# Patient Record
Sex: MALE | Race: BLACK OR AFRICAN AMERICAN | NOT HISPANIC OR LATINO | Employment: FULL TIME | URBAN - METROPOLITAN AREA
[De-identification: names, ages, dates, MRNs, and addresses within clinical notes are randomized per-mention and may not be internally consistent; named-entity substitution may affect disease eponyms.]

---

## 2023-09-12 ENCOUNTER — HOSPITAL ENCOUNTER (EMERGENCY)
Facility: CLINIC | Age: 23
Discharge: HOME | End: 2023-09-12
Attending: STUDENT IN AN ORGANIZED HEALTH CARE EDUCATION/TRAINING PROGRAM | Admitting: STUDENT IN AN ORGANIZED HEALTH CARE EDUCATION/TRAINING PROGRAM
Payer: COMMERCIAL

## 2023-09-12 VITALS
SYSTOLIC BLOOD PRESSURE: 120 MMHG | OXYGEN SATURATION: 100 % | WEIGHT: 192 LBS | HEART RATE: 54 BPM | BODY MASS INDEX: 24.64 KG/M2 | TEMPERATURE: 98.2 F | DIASTOLIC BLOOD PRESSURE: 80 MMHG | RESPIRATION RATE: 18 BRPM | HEIGHT: 74 IN

## 2023-09-12 DIAGNOSIS — R22.32 AXILLARY LUMP, LEFT: ICD-10-CM

## 2023-09-12 DIAGNOSIS — M79.645 FINGER PAIN, LEFT: ICD-10-CM

## 2023-09-12 DIAGNOSIS — M25.572 ACUTE LEFT ANKLE PAIN: ICD-10-CM

## 2023-09-12 PROCEDURE — 99283 EMERGENCY DEPT VISIT LOW MDM: CPT

## 2023-09-12 ASSESSMENT — ACTIVITIES OF DAILY LIVING (ADL): ADLS_ACUITY_SCORE: 35

## 2023-09-12 NOTE — ED PROVIDER NOTES
"  History     Chief Complaint:  Hand Pain and Arm Pain       The history is provided by the patient.      Curtis Chand is an otherwise healthy 22 year old male who presents with multiple complaints. He first noticed a lump in his left arm pit yesterday, and continues to have it since then. The area is not painful until it pressed on. He denies recent illnesses, and does not have a history of abscesses. He denies cough, sore throat, fever, diaphoresis, or recent weight loss. He does not have a family history of breast cancer.     Additionally, he has been having left ankle pain lately. He previously sprained it last year, and rolled it a couple times within the past month. He is having some difficulty bearing weight on the foot. He denies numbness/tingling.     Furthermore, he complains of pain to his lateral left pinky finger lately. He denies recent trauma or injuries to the finger.     Independent Historian:   None - Patient Only    Review of External Notes:   None     Medications:    The patient denies current use of medications.     Past Medical History:    The patient denies pertinent past medical history.    Past Surgical History:    The patient denies pertinent past surgical history.      Physical Exam   Patient Vitals for the past 24 hrs:   BP Temp Pulse Resp SpO2 Height Weight   09/12/23 1655 -- -- -- -- 100 % -- --   09/12/23 1654 -- -- -- -- 100 % -- --   09/12/23 1653 120/80 98.2  F (36.8  C) 54 18 100 % 1.88 m (6' 2\") 87.1 kg (192 lb)        Physical Exam  Vitals: Reviewed, as above.    General: Alert and oriented. Resting on bed.  Skin: Warm and well-perfused.    HEENT:   Head: Normocephalic, atraumatic. Facial features symmetric.   Eyes: Conjunctiva pink, sclera white. EOMs grossly intact.   Ears: Auricles without lesion, erythema, or edema.   Nose: Symmetric with no discharge.  Mouth and throat: Lips are moist. Buccal mucosa is pink and moist without lesions. Oropharyngeal mucosa is pink and " moist with no erythema, edema, or exudate. Uvula is midline.  Neck: Supple with no lymphadenopathy. Full ROM.   Pulmonary: Chest wall expansion symmetric with no increased work of breathing. Lungs clear to auscultation bilaterally.   Breast/Lymphatic: 1 cm tender, mobile, soft mass to the left axilla. No erythema or exudate. No masses to the bilateral breast tissue or the right axilla.   Cardiovascular: Heart RRR with no murmurs. 2+ radial and tibialis posterior pulses bilaterally. No peripheral edema.  Musculoskeletal: Moves all extremities spontaneously.  No bimalleolar tenderness, proximal metatarsal tenderness, or proximal fibular head tendernessto the left ankle.  Nontender palpation of the left fingers with no erythema.  Neuro: 5/5 strength with flexion and extension at bilateral hips, knees, and ankles.  I suspect her compression extension of the left fifth (small) finger MCP, PIP, and DIP  Psych: Affect appropriate.  Answers questions appropriately. Patient appears calm.      Emergency Department Course     Emergency Department Course & Assessments:    Interventions:  None      Assessments:  1658 My PA student first obtained history and relayed the information back to me.  1710 I obtained history and examined the patient as noted above. I explained findings and discussed plan for discharge home.    Independent Interpretation (X-rays, CTs, rhythm strip):  None    Consultations/Discussion of Management or Tests:  None     Social Determinants of Health affecting care:   Lacks established primary care     Disposition:  The patient was discharged to home.     Impression & Plan      Medical Decision Making:  Curtis Chand is an otherwise healthy 22 year old male who presents with multiple complaints.  Please see HPI and exam for details.  Differential includes fracture, dislocation, sprain, abscess, hidradenitis suppurativa, malignancy such as lymphoma, among others.  Patient has a reassuring physical exam  with stable vitals.  He has no systemic symptoms and is afebrile.  With regard to his ankle pain, this is a chronic problem following a sprain 1 and 1/2 years ago, and I recommended that he follow-up with his PCP or with orthopedics.  With regard to his pinky pain, he is actually not very concerned about this, and there is no abnormality seen on exam with no erythema or pain with range of motion.  With regard to the lump in his axilla, this is most likely a reactive lymph node, however developing abscess and lymphoma do remain on the differential.  Patient will likely benefit from outpatient ultrasound, which can be arranged through his primary provider.  I did provide a referral for primary care to help to locate this process.  No indication for emergent ultrasound imaging today.  Return precautions discussed for systemic symptoms, fevers, redness, swelling, or internal pain.  He is comfortable with this plan.      Diagnosis:    ICD-10-CM    1. Axillary lump, left  R22.32 Primary Care Referral      2. Finger pain, left  M79.645       3. Acute left ankle pain  M25.572            Scribe Disclosure:  I, Star Cortez, am serving as a scribe at 5:28 PM on 9/12/2023 to document services personally performed by Claudia Veliz PA-C based on my observations and the provider's statements to me.   9/12/2023   Claudia Veliz PA-C Sells, Jenna, PA-C  09/12/23 1932

## 2023-09-12 NOTE — DISCHARGE INSTRUCTIONS
Like we discussed, I am going to place a referral for primary care.  They will want to follow-up on the lump in your left armpit.  Sometimes, if these do not go away, they want to ultrasound to make sure that it is not something more serious.    Please take ibuprofen and Tylenol for your pinky pain and ankle pain.  I also provided you with the phone number for orthopedics, as are more likely to get in with them quicker than with family care to follow-up on your ankle pain.    Please return for redness, swelling, drainage, fevers, or other new concerns

## 2024-06-12 ENCOUNTER — APPOINTMENT (OUTPATIENT)
Dept: URBAN - METROPOLITAN AREA CLINIC 256 | Age: 24
Setting detail: DERMATOLOGY
End: 2024-06-12

## 2024-06-12 VITALS — HEIGHT: 74 IN | WEIGHT: 197 LBS

## 2024-06-12 DIAGNOSIS — B36.0 PITYRIASIS VERSICOLOR: ICD-10-CM

## 2024-06-12 DIAGNOSIS — L70.0 ACNE VULGARIS: ICD-10-CM

## 2024-06-12 PROCEDURE — 99204 OFFICE O/P NEW MOD 45 MIN: CPT

## 2024-06-12 PROCEDURE — OTHER PRESCRIPTION MEDICATION MANAGEMENT: OTHER

## 2024-06-12 PROCEDURE — OTHER MIPS QUALITY: OTHER

## 2024-06-12 PROCEDURE — OTHER PHOTO-DOCUMENTATION: OTHER

## 2024-06-12 PROCEDURE — OTHER OTC TREATMENT REGIMEN: OTHER

## 2024-06-12 PROCEDURE — OTHER COUNSELING: OTHER

## 2024-06-12 PROCEDURE — OTHER PRESCRIPTION: OTHER

## 2024-06-12 PROCEDURE — OTHER PATIENT SPECIFIC COUNSELING: OTHER

## 2024-06-12 RX ORDER — CLINDAMYCIN PHOSPHATE 10 MG/ML
LOTION TOPICAL
Qty: 60 | Refills: 2 | Status: ERX | COMMUNITY
Start: 2024-06-12

## 2024-06-12 RX ORDER — KETOCONAZOLE 20 MG/ML
SHAMPOO, SUSPENSION TOPICAL QD
Qty: 120 | Refills: 2 | Status: ERX | COMMUNITY
Start: 2024-06-12

## 2024-06-12 RX ORDER — TRETIONIN 1 MG/G
CREAM TOPICAL
Qty: 45 | Refills: 2 | Status: ERX | COMMUNITY
Start: 2024-06-12

## 2024-06-12 ASSESSMENT — LOCATION DETAILED DESCRIPTION DERM
LOCATION DETAILED: MID POSTERIOR NECK
LOCATION DETAILED: RIGHT MEDIAL FOREHEAD
LOCATION DETAILED: RIGHT INFERIOR CENTRAL MALAR CHEEK
LOCATION DETAILED: LEFT INFERIOR CENTRAL MALAR CHEEK

## 2024-06-12 ASSESSMENT — LOCATION SIMPLE DESCRIPTION DERM
LOCATION SIMPLE: RIGHT FOREHEAD
LOCATION SIMPLE: LEFT CHEEK
LOCATION SIMPLE: POSTERIOR NECK
LOCATION SIMPLE: RIGHT CHEEK

## 2024-06-12 ASSESSMENT — LOCATION ZONE DERM
LOCATION ZONE: FACE
LOCATION ZONE: NECK

## 2024-06-12 NOTE — HPI: SECONDARY COMPLAINT
Additional History: The patient mentioned flaring it the summer and was prescribed a shampoo that treated it.

## 2024-06-12 NOTE — PROCEDURE: PRESCRIPTION MEDICATION MANAGEMENT
Initiate Treatment: Apply clindamycin 1% lotion to affected areas once to twice daily.\\nApply tretinoin 0.1 % topical cream to affected areas nightly. May start with every other night x 2-4 weeks and increase as tolerated.
Plan: Recheck in 2-3 months, sooner if worsening.
Detail Level: Zone
Render In Strict Bullet Format?: No
Initiate Treatment: Use ketoconazole 2 % shampoo as a body wash, leave on for 5 minutes, then rinse off. Do this until clear and repeat with flares.

## 2024-06-12 NOTE — PROCEDURE: OTC TREATMENT REGIMEN
Samples Given: Cerave BPO 4-10%
Patient Specific Otc Recommendations (Will Not Stick From Patient To Patient): Wash face with Cerave BPO 4-10% once in the morning
Detail Level: Zone

## 2024-06-12 NOTE — PROCEDURE: COUNSELING
Bactrim Counseling:  I discussed with the patient the risks of sulfa antibiotics including but not limited to GI upset, allergic reaction, drug rash, diarrhea, dizziness, photosensitivity, and yeast infections.  Rarely, more serious reactions can occur including but not limited to aplastic anemia, agranulocytosis, methemoglobinemia, blood dyscrasias, liver or kidney failure, lung infiltrates or desquamative/blistering drug rashes.
Dapsone Pregnancy And Lactation Text: This medication is Pregnancy Category C and is not considered safe during pregnancy or breast feeding.
High Dose Vitamin A Pregnancy And Lactation Text: High dose vitamin A therapy is contraindicated during pregnancy and breast feeding.
Spironolactone Pregnancy And Lactation Text: This medication can cause feminization of the male fetus and should be avoided during pregnancy. The active metabolite is also found in breast milk.
Tazorac Counseling:  Patient advised that medication is irritating and drying.  Patient may need to apply sparingly and wash off after an hour before eventually leaving it on overnight.  The patient verbalized understanding of the proper use and possible adverse effects of tazorac.  All of the patient's questions and concerns were addressed.
Doxycycline Pregnancy And Lactation Text: This medication is Pregnancy Category D and not consider safe during pregnancy. It is also excreted in breast milk but is considered safe for shorter treatment courses.
Use Enhanced Medication Counseling?: No
Dapsone Counseling: I discussed with the patient the risks of dapsone including but not limited to hemolytic anemia, agranulocytosis, rashes, methemoglobinemia, kidney failure, peripheral neuropathy, headaches, GI upset, and liver toxicity.  Patients who start dapsone require monitoring including baseline LFTs and weekly CBCs for the first month, then every month thereafter.  The patient verbalized understanding of the proper use and possible adverse effects of dapsone.  All of the patient's questions and concerns were addressed.
Minocycline Pregnancy And Lactation Text: This medication is Pregnancy Category D and not consider safe during pregnancy. It is also excreted in breast milk.
Birth Control Pills Counseling: Birth Control Pill Counseling: I discussed with the patient the potential side effects of OCPs including but not limited to increased risk of stroke, heart attack, thrombophlebitis, deep venous thrombosis, hepatic adenomas, breast changes, GI upset, headaches, and depression.  The patient verbalized understanding of the proper use and possible adverse effects of OCPs. All of the patient's questions and concerns were addressed.
Tazorac Pregnancy And Lactation Text: This medication is not safe during pregnancy. It is unknown if this medication is excreted in breast milk.
Topical Clindamycin Counseling: Patient counseled that this medication may cause skin irritation or allergic reactions.  In the event of skin irritation, the patient was advised to reduce the amount of the drug applied or use it less frequently.   The patient verbalized understanding of the proper use and possible adverse effects of clindamycin.  All of the patient's questions and concerns were addressed.
Isotretinoin Counseling: Patient should get monthly blood tests, not donate blood, not drive at night if vision affected, not share medication, and not undergo elective surgery for 6 months after tx completed. Side effects reviewed, pt to contact office should one occur.
Azelaic Acid Pregnancy And Lactation Text: This medication is considered safe during pregnancy and breast feeding.
Tetracycline Counseling: Patient counseled regarding possible photosensitivity and increased risk for sunburn.  Patient instructed to avoid sunlight, if possible.  When exposed to sunlight, patients should wear protective clothing, sunglasses, and sunscreen.  The patient was instructed to call the office immediately if the following severe adverse effects occur:  hearing changes, easy bruising/bleeding, severe headache, or vision changes.  The patient verbalized understanding of the proper use and possible adverse effects of tetracycline.  All of the patient's questions and concerns were addressed. Patient understands to avoid pregnancy while on therapy due to potential birth defects.
Aklief counseling:  Patient advised to apply a pea-sized amount only at bedtime and wait 30 minutes after washing their face before applying.  If too drying, patient may add a non-comedogenic moisturizer.  The most commonly reported side effects including irritation, redness, scaling, dryness, stinging, burning, itching, and increased risk of sunburn.  The patient verbalized understanding of the proper use and possible adverse effects of retinoids.  All of the patient's questions and concerns were addressed.
Topical Retinoid counseling:  Patient advised to apply a pea-sized amount only at bedtime and wait 30 minutes after washing their face before applying.  If too drying, patient may add a non-comedogenic moisturizer. The patient verbalized understanding of the proper use and possible adverse effects of retinoids.  All of the patient's questions and concerns were addressed.
Sarecycline Counseling: Patient advised regarding possible photosensitivity and discoloration of the teeth, skin, lips, tongue and gums.  Patient instructed to avoid sunlight, if possible.  When exposed to sunlight, patients should wear protective clothing, sunglasses, and sunscreen.  The patient was instructed to call the office immediately if the following severe adverse effects occur:  hearing changes, easy bruising/bleeding, severe headache, or vision changes.  The patient verbalized understanding of the proper use and possible adverse effects of sarecycline.  All of the patient's questions and concerns were addressed.
Benzoyl Peroxide Pregnancy And Lactation Text: This medication is Pregnancy Category C. It is unknown if benzoyl peroxide is excreted in breast milk.
Minocycline Counseling: Patient advised regarding possible photosensitivity and discoloration of the teeth, skin, lips, tongue and gums.  Patient instructed to avoid sunlight, if possible.  When exposed to sunlight, patients should wear protective clothing, sunglasses, and sunscreen.  The patient was instructed to call the office immediately if the following severe adverse effects occur:  hearing changes, easy bruising/bleeding, severe headache, or vision changes.  The patient verbalized understanding of the proper use and possible adverse effects of minocycline.  All of the patient's questions and concerns were addressed.
High Dose Vitamin A Counseling: Side effects reviewed, pt to contact office should one occur.
Isotretinoin Pregnancy And Lactation Text: This medication is Pregnancy Category X and is considered extremely dangerous during pregnancy. It is unknown if it is excreted in breast milk.
Birth Control Pills Pregnancy And Lactation Text: This medication should be avoided if pregnant and for the first 30 days post-partum.
Topical Sulfur Applications Pregnancy And Lactation Text: This medication is Pregnancy Category C and has an unknown safety profile during pregnancy. It is unknown if this topical medication is excreted in breast milk.
Topical Retinoid Pregnancy And Lactation Text: This medication is Pregnancy Category C. It is unknown if this medication is excreted in breast milk.
Spironolactone Counseling: Patient advised regarding risks of diarrhea, abdominal pain, hyperkalemia, birth defects (for female patients), liver toxicity and renal toxicity. The patient may need blood work to monitor liver and kidney function and potassium levels while on therapy. The patient verbalized understanding of the proper use and possible adverse effects of spironolactone.  All of the patient's questions and concerns were addressed.
Doxycycline Counseling:  Patient counseled regarding possible photosensitivity and increased risk for sunburn.  Patient instructed to avoid sunlight, if possible.  When exposed to sunlight, patients should wear protective clothing, sunglasses, and sunscreen.  The patient was instructed to call the office immediately if the following severe adverse effects occur:  hearing changes, easy bruising/bleeding, severe headache, or vision changes.  The patient verbalized understanding of the proper use and possible adverse effects of doxycycline.  All of the patient's questions and concerns were addressed.
Benzoyl Peroxide Counseling: Patient counseled that medicine may cause skin irritation and bleach clothing.  In the event of skin irritation, the patient was advised to reduce the amount of the drug applied or use it less frequently.   The patient verbalized understanding of the proper use and possible adverse effects of benzoyl peroxide.  All of the patient's questions and concerns were addressed.
Erythromycin Counseling:  I discussed with the patient the risks of erythromycin including but not limited to GI upset, allergic reaction, drug rash, diarrhea, increase in liver enzymes, and yeast infections.
Winlevi Pregnancy And Lactation Text: This medication is considered safe during pregnancy and breastfeeding.
Aklief Pregnancy And Lactation Text: It is unknown if this medication is safe to use during pregnancy.  It is unknown if this medication is excreted in breast milk.  Breastfeeding women should use the topical cream on the smallest area of the skin for the shortest time needed while breastfeeding.  Do not apply to nipple and areola.
Erythromycin Pregnancy And Lactation Text: This medication is Pregnancy Category B and is considered safe during pregnancy. It is also excreted in breast milk.
Azithromycin Pregnancy And Lactation Text: This medication is considered safe during pregnancy and is also secreted in breast milk.
Bactrim Pregnancy And Lactation Text: This medication is Pregnancy Category D and is known to cause fetal risk.  It is also excreted in breast milk.
Azithromycin Counseling:  I discussed with the patient the risks of azithromycin including but not limited to GI upset, allergic reaction, drug rash, diarrhea, and yeast infections.
Topical Sulfur Applications Counseling: Topical Sulfur Counseling: Patient counseled that this medication may cause skin irritation or allergic reactions.  In the event of skin irritation, the patient was advised to reduce the amount of the drug applied or use it less frequently.   The patient verbalized understanding of the proper use and possible adverse effects of topical sulfur application.  All of the patient's questions and concerns were addressed.
Topical Clindamycin Pregnancy And Lactation Text: This medication is Pregnancy Category B and is considered safe during pregnancy. It is unknown if it is excreted in breast milk.
Winlevi Counseling:  I discussed with the patient the risks of topical clascoterone including but not limited to erythema, scaling, itching, and stinging. Patient voiced their understanding.
Detail Level: Detailed
Azelaic Acid Counseling: Patient counseled that medicine may cause skin irritation and to avoid applying near the eyes.  In the event of skin irritation, the patient was advised to reduce the amount of the drug applied or use it less frequently.   The patient verbalized understanding of the proper use and possible adverse effects of azelaic acid.  All of the patient's questions and concerns were addressed.

## 2024-06-12 NOTE — PROCEDURE: PATIENT SPECIFIC COUNSELING
- Discussed options of topicals vs orals and pros and cons of each\\n- Patient agreeable to starting with topicals and his regimen will consist of washing face in the morning with BPO 4-10%, application of clindamycin lotion 1-2 times daily. In the evening, tretinoin 0.1 % topical cream nightly\\n- If symptoms persist or worsening despite current treatment, will consider accutane in future\\n- Recheck in 2-3 months, sooner with concerns\\n- The patient was agreeable
Detail Level: Zone

## 2024-09-30 ENCOUNTER — HOSPITAL ENCOUNTER (EMERGENCY)
Facility: CLINIC | Age: 24
Discharge: HOME | End: 2024-09-30
Attending: EMERGENCY MEDICINE | Admitting: EMERGENCY MEDICINE
Payer: MEDICAID

## 2024-09-30 VITALS
OXYGEN SATURATION: 99 % | BODY MASS INDEX: 25.28 KG/M2 | TEMPERATURE: 97.9 F | SYSTOLIC BLOOD PRESSURE: 134 MMHG | HEART RATE: 53 BPM | HEIGHT: 74 IN | WEIGHT: 197 LBS | DIASTOLIC BLOOD PRESSURE: 73 MMHG | RESPIRATION RATE: 14 BRPM

## 2024-09-30 DIAGNOSIS — M77.11 LATERAL EPICONDYLITIS OF RIGHT ELBOW: ICD-10-CM

## 2024-09-30 DIAGNOSIS — B35.9 TINEA: ICD-10-CM

## 2024-09-30 LAB
ALBUMIN UR-MCNC: NEGATIVE MG/DL
APPEARANCE UR: CLEAR
BACTERIA #/AREA URNS HPF: ABNORMAL /HPF
BILIRUB UR QL STRIP: NEGATIVE
COLOR UR AUTO: ABNORMAL
GLUCOSE UR STRIP-MCNC: NEGATIVE MG/DL
HGB UR QL STRIP: ABNORMAL
KETONES UR STRIP-MCNC: NEGATIVE MG/DL
LEUKOCYTE ESTERASE UR QL STRIP: NEGATIVE
MUCOUS THREADS #/AREA URNS LPF: PRESENT /LPF
NITRATE UR QL: NEGATIVE
PH UR STRIP: 6.5 [PH] (ref 5–7)
RBC URINE: 2 /HPF
SP GR UR STRIP: 1.03 (ref 1–1.03)
UROBILINOGEN UR STRIP-MCNC: 2 MG/DL
WBC URINE: <1 /HPF

## 2024-09-30 PROCEDURE — 81001 URINALYSIS AUTO W/SCOPE: CPT | Performed by: EMERGENCY MEDICINE

## 2024-09-30 PROCEDURE — 99283 EMERGENCY DEPT VISIT LOW MDM: CPT

## 2024-09-30 ASSESSMENT — COLUMBIA-SUICIDE SEVERITY RATING SCALE - C-SSRS
1. IN THE PAST MONTH, HAVE YOU WISHED YOU WERE DEAD OR WISHED YOU COULD GO TO SLEEP AND NOT WAKE UP?: NO
2. HAVE YOU ACTUALLY HAD ANY THOUGHTS OF KILLING YOURSELF IN THE PAST MONTH?: NO
6. HAVE YOU EVER DONE ANYTHING, STARTED TO DO ANYTHING, OR PREPARED TO DO ANYTHING TO END YOUR LIFE?: NO

## 2024-09-30 ASSESSMENT — ACTIVITIES OF DAILY LIVING (ADL)
ADLS_ACUITY_SCORE: 35
ADLS_ACUITY_SCORE: 35

## 2024-09-30 NOTE — ED PROVIDER NOTES
"  Emergency Department Note      History of Present Illness     Chief Complaint  Rash    HPI  Curtis Chand is a 23 year old male who presents to the ED for evaluation of rash. He reports having a rash on the left side of his tip of this penis for the past 2 days. No new sexual partners or penile discharge. He also mentions right elbow pain for the past 5 months. No injuries but he does workout regularly. No real care over time regarding to pain.    Independent Historian  None    Review of External Notes  None  Past Medical History   Medical History and Problem List  No pertinent past medical history     Medications  The patient is not currently taking any prescribed medications.  Physical Exam   Patient Vitals for the past 24 hrs:   BP Temp Temp src Pulse Resp SpO2 Height Weight   09/30/24 1627 134/73 97.9  F (36.6  C) Temporal 53 14 99 % 1.88 m (6' 2\") 89.4 kg (197 lb)     Physical Exam  General: Alert, interactive   Head:  Scalp is atraumatic  Eyes:  No scleral icterus  ENT:      Nose:  The external nose is normal  Ears:  External ears are normal  Mouth/Throat: Mucus membranes are moist      Neck:  Normal range of motion.      There is no rigidity.    Trachea is in the midline         CV:  Regular rate and rhythm    No murmur   Resp:  Breath sounds are clear bilaterally    Non-labored, no retractions or accessory muscle use      GI:  Abdomen is soft, no distension, no tenderness      MS:  Normal strength in all 4 extremities. Elbow - full range of motion, no edema.mild tenderness over the lateral epicondyle  Skin:  Warm and dry, Scaly erythematous rash on the left side of the tip of the penis. No ulceration     Psych: Awake. Alert.  Normal affect.      Appropriate interactions    Diagnostics   Lab Results   Labs Ordered and Resulted from Time of ED Arrival to Time of ED Departure   ROUTINE UA WITH MICROSCOPIC REFLEX TO CULTURE - Abnormal       Result Value    Color Urine Light Yellow      Appearance Urine " Clear      Glucose Urine Negative      Bilirubin Urine Negative      Ketones Urine Negative      Specific Gravity Urine 1.028      Blood Urine Small (*)     pH Urine 6.5      Protein Albumin Urine Negative      Urobilinogen Urine 2.0      Nitrite Urine Negative      Leukocyte Esterase Urine Negative      Bacteria Urine Few (*)     Mucus Urine Present (*)     RBC Urine 2      WBC Urine <1       ED Course    Medications Administered  Medications - No data to display    Procedures  Procedures     Discussion of Management  ED Pharmacist    Optional/Additional Documentation  None    ED Course  ED Course as of 09/30/24 2138   Mon Sep 30, 2024   1830 I obtained history and examined the patient as noted above.    1834 I prepared the patient to be discharged home.    1837 I followed up with our ED Pharmacist regarding patient's treatment options.      Medical Decision Making / Diagnosis   CMS Diagnoses: None    MIPS     None    MDM  Follow presentation history and physical examination are performed, findings are consistent with tinea to the tip and shaft of the penis, there is no signs of an ulceration to suggest syphilis, no erythematous vesicles to suggest herpes.  He has had no penile discharge to suggest GC or chlamydia.  I will place him on ketoconazole.  Regarding the patient's elbow pain, there is no signs of fracture, septic arthritis, dislocation, more concerning illness.  This has been a chronic issue for the last several months, I discussed imaging with the patient and he declines.  I think this likely represents a lateral epicondylitis.  He will follow-up with his primary care provider return the emergency department if new symptoms develop.    Disposition  The patient was discharged.     ICD-10 Codes:    ICD-10-CM    1. Tinea  B35.9 Adult Dermatology  Referral      2. Lateral epicondylitis of right elbow  M77.11          Discharge Medications  Discharge Medication List as of 9/30/2024  6:42 PM         START taking these medications    Details   ketoconazole (XOLEGEL) 2 % external gel Apply to rash daily for 14 daysDisp-45 g, [id number]           Scribe Disclosure:  I, Maryellen Buck, am serving as a scribe at 6:29 PM on 9/30/2024 to document services personally performed by Robe Damian MD based on my observations and the provider's statements to me.      Robe Damian MD  09/30/24 [id number]

## 2024-09-30 NOTE — DISCHARGE INSTRUCTIONS
Apply rash as instructed, I have placed a dermatology referral for you, they should call you for an appointment.  Return if new symptoms develop.

## 2024-09-30 NOTE — ED TRIAGE NOTES
Pt reports 4 days of a rash to his R elbow and the tip of his penis.      Triage Assessment (Adult)       Row Name 09/30/24 8572          Triage Assessment    Airway WDL WDL        Respiratory WDL    Respiratory WDL WDL        Skin Circulation/Temperature WDL    Skin Circulation/Temperature WDL X  rash to R elbow and tip of penis per pt        Cardiac WDL    Cardiac WDL WDL        Peripheral/Neurovascular WDL    Peripheral Neurovascular WDL WDL        Cognitive/Neuro/Behavioral WDL    Cognitive/Neuro/Behavioral WDL WDL

## 2024-10-30 ENCOUNTER — APPOINTMENT (OUTPATIENT)
Dept: GENERAL RADIOLOGY | Facility: CLINIC | Age: 24
End: 2024-10-30
Attending: EMERGENCY MEDICINE

## 2024-10-30 ENCOUNTER — HOSPITAL ENCOUNTER (EMERGENCY)
Facility: CLINIC | Age: 24
Discharge: HOME | End: 2024-10-30
Attending: EMERGENCY MEDICINE | Admitting: EMERGENCY MEDICINE

## 2024-10-30 VITALS
RESPIRATION RATE: 16 BRPM | TEMPERATURE: 97.9 F | SYSTOLIC BLOOD PRESSURE: 124 MMHG | HEART RATE: 71 BPM | OXYGEN SATURATION: 98 % | DIASTOLIC BLOOD PRESSURE: 62 MMHG

## 2024-10-30 DIAGNOSIS — S63.041A SUBLUXATION OF CARPOMETACARPAL JOINT OF RIGHT THUMB, INITIAL ENCOUNTER: ICD-10-CM

## 2024-10-30 PROCEDURE — 99284 EMERGENCY DEPT VISIT MOD MDM: CPT | Mod: 25

## 2024-10-30 PROCEDURE — 73140 X-RAY EXAM OF FINGER(S): CPT | Mod: RT

## 2024-10-30 PROCEDURE — 26645 TREAT THUMB FRACTURE: CPT | Mod: RT

## 2024-10-30 ASSESSMENT — ACTIVITIES OF DAILY LIVING (ADL)
ADLS_ACUITY_SCORE: 0
ADLS_ACUITY_SCORE: 0

## 2024-10-30 NOTE — ED PROVIDER NOTES
Emergency Department Note      History of Present Illness     Chief Complaint   Wrist Pain      HPI   Curtis Chand is a 24 year old male who presents for evaluation of wrist pain. Patient reports that he was playing basketball today and went to block the ball with his right hand when the ball pushed his right thumb back causing it to hyperextend. Since then he has been unable to move his thumb and endorses some pain and tingling in the finger.  Denies any other injuries      Independent Historian   None    Review of External Notes   None    Past Medical History     Medical History and Problem List   None     Medications   None     Surgical History   None     Physical Exam     Patient Vitals for the past 24 hrs:   BP Temp Temp src Pulse Resp SpO2   10/30/24 1801 124/62 -- -- 71 -- 98 %   10/30/24 1758 -- 97.9  F (36.6  C) Temporal -- 16 --     Physical Exam  GENERAL: Awake, alert  EXTREMITIES: Deformity to the right MCP joint but patient can flex and extend at the MCP and IP joint. No tenderness to wrist, snuffbox, or remainder of fingers.Tender to the thenar eminence with associated swelling.         Diagnostics     Lab Results   Labs Ordered and Resulted from Time of ED Arrival to Time of ED Departure - No data to display    Imaging   XR Finger Right G/E 2 Views   Final Result   IMPRESSION: There is a volar and ulnar subluxation of the first proximal phalanx at the metacarpophalangeal joint without dislocation. No acute fracture. Joint spaces are preserved.             Independent Interpretation   X-ray right finger shows subluxation of MCP joint in right thumb    ED Course      Medications Administered   Medications - No data to display    Procedures   [facility]    Splint Application    Date/Time: 10/30/2024 7:49 PM    Performed by: Marixa Thomson MD  Authorized by: Marixa Thomson MD    Risks, benefits and alternatives discussed.      PROCEDURE DETAILS     Laterality:   Right    Location:  Hand    Hand:  R hand       Dislocation Reduction   Procedure: Dislocation Reduction  Consent: Verbal from Patient  Risks Discussed: Pain, need for repeat attempts, and unsuccessful attempts  Universal Protocol: Universal protocol was followed and time out conducted just prior to starting procedure, confirming patient identity, site/side, procedure, patient position, and availability of correct equipment and implants.    Indication: Dislocated R first (thumb) finger   Location: Right R first (thumb) finger  Anesthesia/Sedation: none  Procedure Detail: I manipulated the joint including Traction-counter traction    Immobilized with Custom splint (see separate splint note)   Post procedure assessment:  ROM improved   Patient Status: The patient tolerated the procedure well: Yes. There were no complications.        Splint Placement     Procedure: Splint Placement     Indication: Subluxed right thumb    Consent: Verbal     Location: Right thumb    Preparation: Wounds were cleansed and dressed with a non-adherent bandage    Procedure detail:   Splint was applied by Myself  Splint type: Thumb spica   Splint materilal: Plaster  After placement I checked and adjusted the fit as needed to ensure proper positioning/fit   Sensation and circulation are intact after splint placement    Patient Status: The patient tolerated the procedure well: Yes. There were no complications aside from persistent deformity but patient was still able to move the joint.      Discussion of Management   None    ED Course   ED Course as of 10/30/24 1939   Wed Oct 30, 2024   1901 I obtained patient history and performed a physical exam.        Additional Documentation  None    Medical Decision Making / Diagnosis     CMS Diagnoses: None    MIPS       None    [facility]   Curtis Chand is a 24 year old male who presents for evaluation of right thumb injury.  He has tenderness over the right thumb MCP joint and over the thenar eminence  but can still move his thumb at the MCP joint.  X-ray showed a subluxation, no fracture.  I attempted reduction, without success multiple times and there was still a deformity although patient did have good range of motion at the joint.  I offered a digital block to see if this would let me pull more traction but patient declined and wishes to follow with hand surgery.  He was splinted, will return if worsening.    Disposition   The patient was discharged.     Diagnosis     ICD-10-CM    1. Subluxation of carpometacarpal joint of right thumb, initial encounter  S63.041A            Discharge Medications   Discharge Medication List as of 10/30/2024  7:20 PM            Scribe Disclosure:  I, Ute Yost, am serving as a scribe at 7:03 PM on 10/30/2024 to document services personally performed by Marixa Thomson MD based on my observations and the provider's statements to me.        Marixa Thomson MD  10/31/24 0113

## 2024-11-10 ENCOUNTER — HEALTH MAINTENANCE LETTER (OUTPATIENT)
Age: 24
End: 2024-11-10

## 2025-08-25 ENCOUNTER — ANCILLARY PROCEDURE (OUTPATIENT)
Dept: GENERAL RADIOLOGY | Facility: CLINIC | Age: 25
End: 2025-08-25
Attending: PHYSICIAN ASSISTANT
Payer: COMMERCIAL

## 2025-08-25 ENCOUNTER — OFFICE VISIT (OUTPATIENT)
Dept: URGENT CARE | Facility: URGENT CARE | Age: 25
End: 2025-08-25
Payer: COMMERCIAL

## 2025-08-25 VITALS
TEMPERATURE: 97.3 F | RESPIRATION RATE: 16 BRPM | WEIGHT: 198 LBS | SYSTOLIC BLOOD PRESSURE: 117 MMHG | HEIGHT: 74 IN | BODY MASS INDEX: 25.41 KG/M2 | HEART RATE: 51 BPM | OXYGEN SATURATION: 97 % | DIASTOLIC BLOOD PRESSURE: 77 MMHG

## 2025-08-25 DIAGNOSIS — M25.341 CHRONIC INSTABILITY OF METACARPOPHALANGEAL JOINT OF RIGHT THUMB: ICD-10-CM

## 2025-08-25 DIAGNOSIS — G89.29 CHRONIC LEFT SHOULDER PAIN: ICD-10-CM

## 2025-08-25 DIAGNOSIS — M25.512 CHRONIC LEFT SHOULDER PAIN: ICD-10-CM

## 2025-08-25 DIAGNOSIS — M24.812 INTERNAL DERANGEMENT OF SHOULDER, LEFT: Primary | ICD-10-CM

## 2025-08-25 DIAGNOSIS — M24.812 INTERNAL DERANGEMENT OF SHOULDER, LEFT: ICD-10-CM

## 2025-08-25 PROCEDURE — 99203 OFFICE O/P NEW LOW 30 MIN: CPT | Performed by: PHYSICIAN ASSISTANT

## 2025-08-25 PROCEDURE — 73030 X-RAY EXAM OF SHOULDER: CPT | Mod: TC | Performed by: RADIOLOGY

## 2025-08-25 RX ORDER — NAPROXEN 500 MG/1
500 TABLET ORAL 2 TIMES DAILY WITH MEALS
Qty: 30 TABLET | Refills: 3 | Status: SHIPPED | OUTPATIENT
Start: 2025-08-25 | End: 2026-08-25